# Patient Record
Sex: FEMALE | Race: WHITE | HISPANIC OR LATINO | Employment: OTHER | URBAN - METROPOLITAN AREA
[De-identification: names, ages, dates, MRNs, and addresses within clinical notes are randomized per-mention and may not be internally consistent; named-entity substitution may affect disease eponyms.]

---

## 2017-06-30 ENCOUNTER — OFFICE VISIT (OUTPATIENT)
Dept: URGENT CARE | Facility: PHYSICIAN GROUP | Age: 77
End: 2017-06-30
Payer: COMMERCIAL

## 2017-06-30 ENCOUNTER — HOSPITAL ENCOUNTER (OUTPATIENT)
Facility: MEDICAL CENTER | Age: 77
End: 2017-06-30
Attending: PHYSICIAN ASSISTANT
Payer: COMMERCIAL

## 2017-06-30 VITALS
DIASTOLIC BLOOD PRESSURE: 70 MMHG | HEART RATE: 67 BPM | HEIGHT: 63 IN | WEIGHT: 149 LBS | OXYGEN SATURATION: 95 % | SYSTOLIC BLOOD PRESSURE: 138 MMHG | BODY MASS INDEX: 26.4 KG/M2 | TEMPERATURE: 97.4 F

## 2017-06-30 DIAGNOSIS — N30.00 ACUTE CYSTITIS WITHOUT HEMATURIA: ICD-10-CM

## 2017-06-30 LAB
APPEARANCE UR: CLEAR
BILIRUB UR STRIP-MCNC: NORMAL MG/DL
COLOR UR AUTO: YELLOW
GLUCOSE UR STRIP.AUTO-MCNC: NORMAL MG/DL
KETONES UR STRIP.AUTO-MCNC: NORMAL MG/DL
LEUKOCYTE ESTERASE UR QL STRIP.AUTO: NORMAL
NITRITE UR QL STRIP.AUTO: NORMAL
PH UR STRIP.AUTO: 5 [PH] (ref 5–8)
PROT UR QL STRIP: NORMAL MG/DL
RBC UR QL AUTO: NORMAL
SP GR UR STRIP.AUTO: 1.02
UROBILINOGEN UR STRIP-MCNC: NORMAL MG/DL

## 2017-06-30 PROCEDURE — 99204 OFFICE O/P NEW MOD 45 MIN: CPT | Performed by: PHYSICIAN ASSISTANT

## 2017-06-30 PROCEDURE — 87077 CULTURE AEROBIC IDENTIFY: CPT

## 2017-06-30 PROCEDURE — 81002 URINALYSIS NONAUTO W/O SCOPE: CPT | Performed by: PHYSICIAN ASSISTANT

## 2017-06-30 PROCEDURE — 87086 URINE CULTURE/COLONY COUNT: CPT

## 2017-06-30 PROCEDURE — 87186 SC STD MICRODIL/AGAR DIL: CPT

## 2017-06-30 RX ORDER — CEFDINIR 300 MG/1
300 CAPSULE ORAL EVERY 12 HOURS
Qty: 10 CAP | Refills: 0 | Status: SHIPPED | OUTPATIENT
Start: 2017-06-30 | End: 2017-07-05

## 2017-06-30 ASSESSMENT — ENCOUNTER SYMPTOMS
SHORTNESS OF BREATH: 0
PALPITATIONS: 0
FLANK PAIN: 0
BACK PAIN: 0
CHILLS: 0
NAUSEA: 0
FEVER: 0
VOMITING: 0
COUGH: 0

## 2017-06-30 NOTE — PROGRESS NOTES
Subjective:      Aurelia Liz is a 76 y.o. female who presents with Painful Urination            Dysuria   This is a new problem. The current episode started yesterday. The problem occurs every urination. The problem has been unchanged. The quality of the pain is described as burning. The pain is moderate. There has been no fever. There is no history of pyelonephritis. Associated symptoms include frequency, hesitancy and urgency. Pertinent negatives include no chills, discharge, flank pain, hematuria, nausea, possible pregnancy or vomiting. She has tried nothing for the symptoms.       Review of Systems   Constitutional: Negative for fever and chills.   Respiratory: Negative for cough and shortness of breath.    Cardiovascular: Negative for chest pain and palpitations.   Gastrointestinal: Negative for nausea and vomiting.   Genitourinary: Positive for dysuria, hesitancy, urgency and frequency. Negative for hematuria and flank pain.   Musculoskeletal: Negative for back pain.     All other systems reviewed and are negative.  PMH:  has a past medical history of Benign tumor of breast; HTN (hypertension); Indigestion; Unspecified cataract; Pain; and Renal disorder.  MEDS:   Current outpatient prescriptions:   •  cefdinir (OMNICEF) 300 MG Cap, Take 1 Cap by mouth every 12 hours for 5 days., Disp: 10 Cap, Rfl: 0  •  vitamin D (CHOLECALCIFEROL) 1000 UNIT TABS, Take 1,000 Units by mouth every day., Disp: , Rfl:   •  Zolpidem Tartrate (AMBIEN PO), Take  by mouth. AT NIGHT PRN, Disp: , Rfl:   •  acetaminophen (TYLENOL) 325 MG TABS, Take 650 mg by mouth every 6 hours as needed., Disp: , Rfl:   •  MULTIPLE VITAMINS PO, Take  by mouth every day., Disp: , Rfl:   •  Fiber CAPS, Take 1 Tab by mouth every day., Disp: , Rfl:   •  tramadol (ULTRAM) 50 MG TABS, Take 50 mg by mouth every four hours as needed., Disp: , Rfl:   •  oxycodone-acetaminophen (PERCOCET) 5-325 MG TABS, Take 1-2 Tabs by mouth every four hours as needed.,  "Disp: 12 Tab, Rfl: 0  •  ondansetron (ZOFRAN ODT) 4 MG TBDP, Take 1 Tab by mouth every 8 hours as needed for Nausea/Vomiting., Disp: 10 Tab, Rfl: 0  •  aspirin EC (ECOTRIN) 81 MG TBEC, Take 81 mg by mouth every day., Disp: , Rfl:   •  olmesartan-hydrochlorothiazide (BENICAR HCT) 20-12.5 MG per tablet, Take 1 Tab by mouth 2 times a day., Disp: , Rfl:   ALLERGIES:   Allergies   Allergen Reactions   • Sulfa Drugs Hives   • Other Drug      'NO IBUPROFEN,R/T KIDNEY FUNCTION'      SURGHX:   Past Surgical History   Procedure Laterality Date   • Cataract phaco with iol  5/13/2013     Performed by Chris Nguyen M.D. at SURGERY [facility]   • Cataract phaco with iol  6/3/2013     Performed by Chris Nguyen M.D. at SURGERY [facility]   • Hysterectomy radical     • Knee arthroscopy  12/18/2014     Performed by Robert Hernandez M.D. at SURGERY [facility]   • Medial meniscectomy  12/18/2014     Performed by Robert Hernandez M.D. at SURGERY [facility]     SOCHX:  reports that she has quit smoking. Her smoking use included Cigarettes. She started smoking about 28 years ago. She has a 30 pack-year smoking history. She does not have any smokeless tobacco history on file. She reports that she drinks alcohol. She reports that she does not use illicit drugs.  FH: Family history was reviewed, no pertinent findings to report  Medications, Allergies, and current problem list reviewed today in Epic           Objective:     /70 mmHg  Pulse 67  Temp(Src) 36.3 °C (97.4 °F)  Ht 1.588 m (5' 2.5\")  Wt 67.586 kg (149 lb)  BMI 26.80 kg/m2  SpO2 95%     Physical Exam   Constitutional: She is oriented to person, place, and time. She appears well-developed and well-nourished.   Neck: Normal range of motion. Neck supple.   Cardiovascular: Normal rate, regular rhythm and normal heart sounds.    Pulmonary/Chest: Effort normal and breath sounds normal.   Musculoskeletal: She exhibits no tenderness.   No CVA " tenderness   Neurological: She is alert and oriented to person, place, and time.   Skin: Skin is warm and dry.   Psychiatric: She has a normal mood and affect. Her behavior is normal. Judgment and thought content normal.   Vitals reviewed.          POC Color Yellow Negative Final     POC Appearance Clear Negative Final     POC Leukocyte Esterase 2+ Negative Final     POC Nitrites Pos Negative Final     POC Urobiligen Neg Negative (0.2) mg/dL Final     POC Protein Neg Negative mg/dL Final     POC Urine PH 5.0 5.0 - 8.0 Final     POC Blood 1+ Negative Final     POC Specific Gravity 1.020 <1.005 - >1.030 Final     POC Ketones Neg Negative mg/dL Final     POC Biliruben Neg Negative mg/dL Final     POC Glucose Neg Negative mg/dL           Assessment/Plan:     1. Acute cystitis without hematuria [N30.00]    - POCT Urinalysis  - Urine Culture; Future  - cefdinir (OMNICEF) 300 MG Cap; Take 1 Cap by mouth every 12 hours for 5 days.  Dispense: 10 Cap; Refill: 0    Differential diagnosis, natural history, supportive care, and indications for immediate follow-up discussed at length.   Follow-up with primary care provider within 4-5 days, emergency room precautions discussed.  Patient and/or family appears understanding of information.

## 2017-06-30 NOTE — MR AVS SNAPSHOT
"        Aurelia Petersonlan   2017 10:05 AM   Office Visit   MRN: [id number]    Department:  [facility]   Dept Phone:  [phone]    Description:  Female : 1940   Provider:  Clem Fernandez PA-C           Reason for Visit     Painful Urination Painful urination/burning/frequent urination x2 days       Allergies as of 2017     Allergen Noted Reactions    Sulfa Drugs 2008   Hives    Other Drug 2014       'NO IBUPROFEN,R/T KIDNEY FUNCTION'       You were diagnosed with     Acute cystitis without hematuria   [[id number]]         Vital Signs     Blood Pressure Pulse Temperature Height Weight Body Mass Index    138/70 mmHg 67 36.3 °C (97.4 °F) 1.588 m (5' 2.5\") 67.586 kg (149 lb) 26.80 kg/m2    Oxygen Saturation Smoking Status                95% Former Smoker          Basic Information     Date Of Birth Sex Race Ethnicity Preferred Language    1940 Female White Non- English      Problem List              ICD-10-CM Priority Class Noted - Resolved    DJD (degenerative joint disease) of knee M17.10   2014 - Present      Health Maintenance     Patient has no pending health maintenance at this time      Results     POCT Urinalysis      Component Value Standard Range & Units    POC Color Yellow Negative    POC Appearance Clear Negative    POC Leukocyte Esterase 2+ Negative    POC Nitrites Pos Negative    POC Urobiligen Neg Negative (0.2) mg/dL    POC Protein Neg Negative mg/dL    POC Urine PH 5.0 5.0 - 8.0    POC Blood 1+ Negative    POC Specific Gravity 1.020 <1.005 - >1.030    POC Ketones Neg Negative mg/dL    POC Biliruben Neg Negative mg/dL    POC Glucose Neg Negative mg/dL                        Current Immunizations     No immunizations on file.      Below and/or attached are the medications your provider expects you to take. Review all of your home medications and newly ordered medications with your provider and/or pharmacist. Follow medication instructions as " directed by your provider and/or pharmacist. Please keep your medication list with you and share with your provider. Update the information when medications are discontinued, doses are changed, or new medications (including over-the-counter products) are added; and carry medication information at all times in the event of emergency situations     Allergies:  SULFA DRUGS - Hives     OTHER DRUG - (reactions not documented)               Medications  Valid as of: June 30, 2017 - 10:40 AM    Generic Name Brand Name Tablet Size Instructions for use    Acetaminophen (Tab) TYLENOL 325 MG Take 650 mg by mouth every 6 hours as needed.        Aspirin (Tablet Delayed Response) ECOTRIN 81 MG Take 81 mg by mouth every day.        Cefdinir (Cap) OMNICEF 300 MG Take 1 Cap by mouth every 12 hours for 5 days.        Cholecalciferol (Tab) cholecalciferol 1000 UNIT Take 1,000 Units by mouth every day.        Fiber (Cap) Fiber  Take 1 Tab by mouth every day.        Multiple Vitamin   Take  by mouth every day.        Olmesartan Medoxomil-HCTZ (Tab) BENICAR HCT 20-12.5 MG Take 1 Tab by mouth 2 times a day.        Ondansetron (TABLET DISPERSIBLE) ZOFRAN ODT 4 MG Take 1 Tab by mouth every 8 hours as needed for Nausea/Vomiting.        Oxycodone-Acetaminophen (Tab) PERCOCET 5-325 MG Take 1-2 Tabs by mouth every four hours as needed.        TraMADol HCl (Tab) ULTRAM 50 MG Take 50 mg by mouth every four hours as needed.        Zolpidem Tartrate   Take  by mouth. AT NIGHT PRN        .                 Medicines prescribed today were sent to:     [id number], [place] - [address] [address]    Phone: [phone] Fax: [phone]    Open 24 Hours?: No      Medication refill instructions:       If your prescription bottle indicates you have medication refills left, it is not necessary to call your provider’s office. Please contact your pharmacy and they will refill your medication.    If your  prescription bottle indicates you do not have any refills left, you may request refills at any time through one of the following ways: The online [employer] system (except Urgent Care), by calling your provider’s office, or by asking your pharmacy to contact your provider’s office with a refill request. Medication refills are processed only during regular business hours and may not be available until the next business day. Your provider may request additional information or to have a follow-up visit with you prior to refilling your medication.   *Please Note: Medication refills are assigned a new Rx number when refilled electronically. Your pharmacy may indicate that no refills were authorized even though a new prescription for the same medication is available at the pharmacy. Please request the medicine by name with the pharmacy before contacting your provider for a refill.        Your To Do List     Future Labs/Procedures Complete By Expires    Urine Culture  As directed 6/30/2018      Instructions      Urinary Tract Infection  A urinary tract infection (UTI) can occur any place along the urinary tract. The tract includes the kidneys, ureters, bladder, and urethra. A type of germ called bacteria often causes a UTI. UTIs are often helped with antibiotic medicine.   HOME CARE   · If given, take antibiotics as told by your doctor. Finish them even if you start to feel better.  · Drink enough fluids to keep your pee (urine) clear or pale yellow.  · Avoid tea, drinks with caffeine, and bubbly (carbonated) drinks.  · Pee often. Avoid holding your pee in for a long time.  · Pee before and after having sex (intercourse).  · Wipe from front to back after you poop (bowel movement) if you are a woman. Use each tissue only once.  GET HELP RIGHT AWAY IF:   · You have back pain.  · You have lower belly (abdominal) pain.  · You have chills.  · You feel sick to your stomach (nauseous).  · You throw up (vomit).  · Your burning or  discomfort with peeing does not go away.  · You have a fever.  · Your symptoms are not better in 3 days.  MAKE SURE YOU:   · Understand these instructions.  · Will watch your condition.  · Will get help right away if you are not doing well or get worse.     This information is not intended to replace advice given to you by your health care provider. Make sure you discuss any questions you have with your health care provider.     Document Released: 06/05/2009 Document Revised: 01/08/2016 Document Reviewed: 07/18/2013  [employer] Interactive Patient Education ©2016 Elsevier Inc.  e          [employer] Access Code: Activation code not generated  Current Soma Status: Active

## 2017-07-02 LAB
BACTERIA UR CULT: ABNORMAL
SIGNIFICANT IND 70042: ABNORMAL
SOURCE SOURCE: ABNORMAL

## 2017-07-10 ENCOUNTER — HOSPITAL ENCOUNTER (OUTPATIENT)
Dept: LAB | Facility: MEDICAL CENTER | Age: 77
End: 2017-07-10
Attending: INTERNAL MEDICINE
Payer: COMMERCIAL

## 2017-07-10 LAB
ALBUMIN SERPL BCP-MCNC: 3.5 G/DL (ref 3.2–4.9)
ALBUMIN/GLOB SERPL: 1.1 G/DL
ALP SERPL-CCNC: 77 U/L (ref 30–99)
ALT SERPL-CCNC: 11 U/L (ref 2–50)
ANION GAP SERPL CALC-SCNC: 8 MMOL/L (ref 0–11.9)
APPEARANCE UR: CLEAR
AST SERPL-CCNC: 16 U/L (ref 12–45)
BASOPHILS # BLD AUTO: 1 % (ref 0–1.8)
BASOPHILS # BLD: 0.07 K/UL (ref 0–0.12)
BILIRUB SERPL-MCNC: 0.6 MG/DL (ref 0.1–1.5)
BILIRUB UR QL STRIP.AUTO: NEGATIVE
BUN SERPL-MCNC: 22 MG/DL (ref 8–22)
CALCIUM SERPL-MCNC: 8.4 MG/DL (ref 8.5–10.5)
CHLORIDE SERPL-SCNC: 108 MMOL/L (ref 96–112)
CO2 SERPL-SCNC: 24 MMOL/L (ref 20–33)
COLOR UR: YELLOW
CREAT SERPL-MCNC: 0.87 MG/DL (ref 0.5–1.4)
EOSINOPHIL # BLD AUTO: 0.17 K/UL (ref 0–0.51)
EOSINOPHIL NFR BLD: 2.4 % (ref 0–6.9)
ERYTHROCYTE [DISTWIDTH] IN BLOOD BY AUTOMATED COUNT: 47.2 FL (ref 35.9–50)
GFR SERPL CREATININE-BSD FRML MDRD: >60 ML/MIN/1.73 M 2
GLOBULIN SER CALC-MCNC: 3.2 G/DL (ref 1.9–3.5)
GLUCOSE SERPL-MCNC: 79 MG/DL (ref 65–99)
GLUCOSE UR STRIP.AUTO-MCNC: NEGATIVE MG/DL
HCT VFR BLD AUTO: 39.5 % (ref 37–47)
HGB BLD-MCNC: 12.8 G/DL (ref 12–16)
IMM GRANULOCYTES # BLD AUTO: 0.02 K/UL (ref 0–0.11)
IMM GRANULOCYTES NFR BLD AUTO: 0.3 % (ref 0–0.9)
KETONES UR STRIP.AUTO-MCNC: NEGATIVE MG/DL
LEUKOCYTE ESTERASE UR QL STRIP.AUTO: NEGATIVE
LYMPHOCYTES # BLD AUTO: 1.6 K/UL (ref 1–4.8)
LYMPHOCYTES NFR BLD: 22.7 % (ref 22–41)
MCH RBC QN AUTO: 30.7 PG (ref 27–33)
MCHC RBC AUTO-ENTMCNC: 32.4 G/DL (ref 33.6–35)
MCV RBC AUTO: 94.7 FL (ref 81.4–97.8)
MICRO URNS: NORMAL
MONOCYTES # BLD AUTO: 0.47 K/UL (ref 0–0.85)
MONOCYTES NFR BLD AUTO: 6.7 % (ref 0–13.4)
NEUTROPHILS # BLD AUTO: 4.72 K/UL (ref 2–7.15)
NEUTROPHILS NFR BLD: 66.9 % (ref 44–72)
NITRITE UR QL STRIP.AUTO: NEGATIVE
NRBC # BLD AUTO: 0 K/UL
NRBC BLD AUTO-RTO: 0 /100 WBC
PH UR STRIP.AUTO: 5.5 [PH]
PHOSPHATE SERPL-MCNC: 3.3 MG/DL (ref 2.5–4.5)
PLATELET # BLD AUTO: 249 K/UL (ref 164–446)
PMV BLD AUTO: 11 FL (ref 9–12.9)
POTASSIUM SERPL-SCNC: 4.1 MMOL/L (ref 3.6–5.5)
PROT SERPL-MCNC: 6.7 G/DL (ref 6–8.2)
PROT UR QL STRIP: NEGATIVE MG/DL
RBC # BLD AUTO: 4.17 M/UL (ref 4.2–5.4)
RBC UR QL AUTO: NEGATIVE
SODIUM SERPL-SCNC: 140 MMOL/L (ref 135–145)
SP GR UR STRIP.AUTO: 1.02
UROBILINOGEN UR STRIP.AUTO-MCNC: 0.2 MG/DL
WBC # BLD AUTO: 7.1 K/UL (ref 4.8–10.8)

## 2017-07-10 PROCEDURE — 81003 URINALYSIS AUTO W/O SCOPE: CPT

## 2017-07-10 PROCEDURE — 36415 COLL VENOUS BLD VENIPUNCTURE: CPT

## 2017-07-10 PROCEDURE — 80053 COMPREHEN METABOLIC PANEL: CPT

## 2017-07-10 PROCEDURE — 85025 COMPLETE CBC W/AUTO DIFF WBC: CPT

## 2017-07-10 PROCEDURE — 84100 ASSAY OF PHOSPHORUS: CPT

## 2017-11-08 ENCOUNTER — HOSPITAL ENCOUNTER (OUTPATIENT)
Dept: LAB | Facility: MEDICAL CENTER | Age: 77
End: 2017-11-08
Attending: PHYSICIAN ASSISTANT
Payer: COMMERCIAL

## 2017-11-08 LAB
25(OH)D3 SERPL-MCNC: 36 NG/ML (ref 30–100)
ALBUMIN SERPL BCP-MCNC: 3.8 G/DL (ref 3.2–4.9)
ALBUMIN/GLOB SERPL: 1.1 G/DL
ALP SERPL-CCNC: 77 U/L (ref 30–99)
ALT SERPL-CCNC: 13 U/L (ref 2–50)
ANION GAP SERPL CALC-SCNC: 11 MMOL/L (ref 0–11.9)
AST SERPL-CCNC: 13 U/L (ref 12–45)
BILIRUB SERPL-MCNC: 0.9 MG/DL (ref 0.1–1.5)
BNP SERPL-MCNC: 9 PG/ML (ref 0–100)
BUN SERPL-MCNC: 31 MG/DL (ref 8–22)
CALCIUM SERPL-MCNC: 9.2 MG/DL (ref 8.5–10.5)
CHLORIDE SERPL-SCNC: 102 MMOL/L (ref 96–112)
CHOLEST SERPL-MCNC: 185 MG/DL (ref 100–199)
CO2 SERPL-SCNC: 25 MMOL/L (ref 20–33)
CREAT SERPL-MCNC: 1.09 MG/DL (ref 0.5–1.4)
ERYTHROCYTE [DISTWIDTH] IN BLOOD BY AUTOMATED COUNT: 47.1 FL (ref 35.9–50)
GFR SERPL CREATININE-BSD FRML MDRD: 49 ML/MIN/1.73 M 2
GLOBULIN SER CALC-MCNC: 3.6 G/DL (ref 1.9–3.5)
GLUCOSE SERPL-MCNC: 102 MG/DL (ref 65–99)
HCT VFR BLD AUTO: 42.9 % (ref 37–47)
HDLC SERPL-MCNC: 47 MG/DL
HGB BLD-MCNC: 14.1 G/DL (ref 12–16)
LDLC SERPL CALC-MCNC: 115 MG/DL
MCH RBC QN AUTO: 30.9 PG (ref 27–33)
MCHC RBC AUTO-ENTMCNC: 32.9 G/DL (ref 33.6–35)
MCV RBC AUTO: 93.9 FL (ref 81.4–97.8)
PLATELET # BLD AUTO: 245 K/UL (ref 164–446)
PMV BLD AUTO: 10.9 FL (ref 9–12.9)
POTASSIUM SERPL-SCNC: 4.6 MMOL/L (ref 3.6–5.5)
PROT SERPL-MCNC: 7.4 G/DL (ref 6–8.2)
RBC # BLD AUTO: 4.57 M/UL (ref 4.2–5.4)
SODIUM SERPL-SCNC: 138 MMOL/L (ref 135–145)
TRIGL SERPL-MCNC: 114 MG/DL (ref 0–149)
TSH SERPL DL<=0.005 MIU/L-ACNC: 3.2 UIU/ML (ref 0.3–3.7)
WBC # BLD AUTO: 5.8 K/UL (ref 4.8–10.8)

## 2017-11-08 PROCEDURE — 83880 ASSAY OF NATRIURETIC PEPTIDE: CPT

## 2017-11-08 PROCEDURE — 84443 ASSAY THYROID STIM HORMONE: CPT

## 2017-11-08 PROCEDURE — 36415 COLL VENOUS BLD VENIPUNCTURE: CPT

## 2017-11-08 PROCEDURE — 82306 VITAMIN D 25 HYDROXY: CPT

## 2017-11-08 PROCEDURE — 85027 COMPLETE CBC AUTOMATED: CPT

## 2017-11-08 PROCEDURE — 80053 COMPREHEN METABOLIC PANEL: CPT

## 2017-11-08 PROCEDURE — 80061 LIPID PANEL: CPT

## 2017-11-09 ENCOUNTER — TELEPHONE (OUTPATIENT)
Dept: CARDIOLOGY | Facility: MEDICAL CENTER | Age: 77
End: 2017-11-09

## 2017-11-09 NOTE — TELEPHONE ENCOUNTER
Spoke with pt and she has not seen a prior cards but was recently seen at [facility] ER. Faxing request for records

## 2017-11-17 ENCOUNTER — OFFICE VISIT (OUTPATIENT)
Dept: CARDIOLOGY | Facility: MEDICAL CENTER | Age: 77
End: 2017-11-17
Payer: COMMERCIAL

## 2017-11-17 VITALS
WEIGHT: 145 LBS | BODY MASS INDEX: 25.69 KG/M2 | HEIGHT: 63 IN | SYSTOLIC BLOOD PRESSURE: 140 MMHG | OXYGEN SATURATION: 93 % | HEART RATE: 86 BPM | DIASTOLIC BLOOD PRESSURE: 70 MMHG

## 2017-11-17 DIAGNOSIS — E78.2 MIXED HYPERLIPIDEMIA: ICD-10-CM

## 2017-11-17 DIAGNOSIS — I10 ESSENTIAL HYPERTENSION, BENIGN: ICD-10-CM

## 2017-11-17 PROCEDURE — 99204 OFFICE O/P NEW MOD 45 MIN: CPT | Performed by: INTERNAL MEDICINE

## 2017-11-17 RX ORDER — AMLODIPINE BESYLATE 5 MG/1
5 TABLET ORAL DAILY
COMMUNITY
End: 2018-01-11 | Stop reason: SDUPTHER

## 2017-11-17 NOTE — PROGRESS NOTES
Subjective:   Aurelia Liz is a 77 y.o. female who presents today for evaluation of hypertension. On  the patient felt her heart pounding  very strong, checked her blood pressure which was quite elevated, then checked it again and it was higher. She decided to go to [facility] for evaluation. Her blood pressure on arrival there was 230 systolic. She didn't receive any treatment for that but underwent a chest x-ray and a CT of the head which were unremarkable. She had troponins which were normal and EKG which apparently was normal. After all these tests were done, her blood pressure came down to her usual and she was discharged home. She followed up with Dr. Roblero who began amlodipine 5 mg a day as a new medication. Previously the patient had been on Benicar/HCT 40/12.5 for several years. The patient has felt slightly different since starting the amlodipine but it is not a terribly uncomfortable feeling. He has occasional palpitations but nothing sustained. She is very careful with salt and has not added salt to her food for quite some time. Her  recently had coronary bypass surgery and for the past 3 weeks they've really changed their eating habits. She is a social drinker and a  nonsmoker since . There is a family history of coronary artery disease but it occurred in her father at age 76 and he lived until age 87. Her mother  at age 83 after gradually going downhill and finally dying in a nursing home. The patient has a history of high cholesterol but no diabetes. She has arthritic issues in her knees but is unlimited in her ability to walk from a heart perspective. She has been under some pressure recently because her 's bypass surgery after a surprise diagnosis of coronary artery disease during a preoperative evaluation. She has no symptoms of congestive heart failure such as orthopnea, PND, pedal edema. She has no stroke or TIA symptoms. There are no  symptoms of claudication.  Past Medical History:   Diagnosis Date   • Benign tumor of breast    • HTN (hypertension)    • Indigestion    • Pain     LEFT KNEE   • Renal disorder     'watching kidney function,sees a nephrologist anually'   • Unspecified cataract          Hyperlipidemia  Past Surgical History:   Procedure Laterality Date   • KNEE ARTHROSCOPY  12/18/2014    Performed by Robert Hernandez M.D. at SURGERY [facility]   • MEDIAL MENISCECTOMY  12/18/2014    Performed by Robret Hernandez M.D. at SURGERY [facility]   • CATARACT PHACO WITH IOL  6/3/2013    Performed by Chris Nguyen M.D. at SURGERY [facility]   • CATARACT PHACO WITH IOL  5/13/2013    Performed by Chris Nguyen M.D. at SURGERY [facility]   • HYSTERECTOMY RADICAL       Family History   Problem Relation Age of Onset   • Cancer Mother    • Cancer Sister      History   Smoking Status   • Former Smoker   • Packs/day: 1.50   • Years: 20.00   • Types: Cigarettes   • Start date: 1/1/1989   Smokeless Tobacco   • Never Used     Allergies   Allergen Reactions   • Sulfa Drugs Hives   • Other Drug      'NO IBUPROFEN,R/T KIDNEY FUNCTION'      Outpatient Encounter Prescriptions as of 11/17/2017   Medication Sig Dispense Refill   • amlodipine (NORVASC) 5 MG Tab Take 5 mg by mouth every day.     • vitamin D (CHOLECALCIFEROL) 1000 UNIT TABS Take 1,000 Units by mouth every day.     • Zolpidem Tartrate (AMBIEN PO) Take  by mouth. AT NIGHT PRN     • aspirin EC (ECOTRIN) 81 MG TBEC Take 81 mg by mouth every day.     • acetaminophen (TYLENOL) 325 MG TABS Take 650 mg by mouth every 6 hours as needed.     • olmesartan-hydrochlorothiazide (BENICAR HCT) 20-12.5 MG per tablet Take 1 Tab by mouth 2 times a day.     • tramadol (ULTRAM) 50 MG TABS Take 50 mg by mouth every four hours as needed.     • oxycodone-acetaminophen (PERCOCET) 5-325 MG TABS Take 1-2 Tabs by mouth every four hours as needed. (Patient not taking: Reported on 11/17/2017)  "12 Tab 0   • ondansetron (ZOFRAN ODT) 4 MG TBDP Take 1 Tab by mouth every 8 hours as needed for Nausea/Vomiting. (Patient not taking: Reported on 11/17/2017) 10 Tab 0   • MULTIPLE VITAMINS PO Take  by mouth every day.     • Fiber CAPS Take 1 Tab by mouth every day.       No facility-administered encounter medications on file as of 11/17/2017.      ROS Review of Systems   Constitutional: Negative for fever, chills, weight loss and fatigue.   HENT: Negative for congestion, sore throat. Headache with HTN  Eyes: No change in vision  Respiratory: Negative for cough, shortness of breath and wheezing.    Cardiovascular: See HPI. No chest pain, pressure, tightness,  No orthopnea, PND, edema. No syncope or lightheadedness  Gastrointestinal: Negative for  vomiting, diarrhea. Slight sick feeling from amlodipine  Musculoskeletal: Negative for muscle aches significant arthritic pain in her knee  Skin: Negative for rash and itching.   Neurological: No loss of sensation or motor function. No tremor  Psychiatric/Behavioral: No depression, suicidal ideation. Somewhat stressful social situation recently  Hematologic: No bleeding tendency or easy bruising     Objective:   /70   Pulse 86   Ht 1.588 m (5' 2.5\")   Wt 65.8 kg (145 lb)   SpO2 93%   BMI 26.10 kg/m²     Physical Exam   Exam:    Vitals:    11/17/17 1452   BP: 140/70   Pulse: 86   SpO2: 93%   Weight: 65.8 kg (145 lb)   Height: 1.588 m (5' 2.5\")     Constitutional: Well developed, well nourished female in no acute distress. Appears approximate stated age and provides a good history.Repeat blood pressure 134/60 8F time sitting 136/66 right arm sitting  HEENT: Normocephalic, pupils are equal and responsive. bilateral arcus. Conjunctiva and sclera are clear. No xanthelasma. diagonal ear lobe crease noted. Mucus membranes are moist and pink. Good oral hygiene  Neck: No JVD or HJR. JVP = 4-5cm H2O. Good carotid upstroke with no bruit. No lymphadenopathy, No thyroid " enlargement.  Cardiovascular: Regular rhythm, no ectopics. No murmur, gallop, rub or click. No lift, heave,  thrill, or cardiomegaly  Lungs: Normal effort, Clear to auscultation and percussion. No rales, rhonchi, wheezing   Abdomen: Soft, non tender. No liver, kidney, spleen or mass palpable. The abdominal aorta is not palpable. Active bowel sounds are noted and there is no bruit  Extremities:  No cyanosis, edema, clubbing. Palpable posterior tibial and dorsal pedal pulses bilaterally. Femoral pulses are good and symmetrical  Skin:   Warm and dry, no active lesions  Neurologic: Alert & oriented. Strength and sensation grossly intact. No lateralizing signs  Psychiatric:  Affect, mood, and judgement are appropriate    Assessment:     1. Essential hypertension, benign     2. Hyperlipidemia   VAP CHOLESTEROL       Medical Decision Making:  Today's Assessment / Status / Plan:   The patient's blood pressure is fairly well controlled on the above regimen. I think the Benicar/HCT is a good baseline form of therapy and amlodipine is a good addition to the above. She feels slightly uneasy taking amlodipine. I suggested that she just check her blood pressure in the morning 2 or 3 times a week. If she finds that it is between one 140-150  first thing in the morning, she could increase the amlodipine to 10 mg a day. The episodes when the  blood pressure went as high as 230 systolic was probably aggravated by anxiety given her stressful social situation at that time. She will check her blood pressure regularly and at times when she feels extremely anxious. She can always take an extra 5 mg of amlodipine if her blood pressure is elevated. She watches salt closely. It is possible that increasing the Benicar and the HCT may be of some benefit in the future if she is unable to tolerate amlodipine.    The patient's cholesterol is reasonable at 185 but her HDL is on the low side for a lady at 47 and her LDL slightly elevated at 115. I  suggested that she have a VAP cholesterol and she returns here in 3 months to determine whether she has predominantly large or small  particles. She has diagonal ear lobe crease and bilateral arcus which might suggest that she does have more  small particles and is a candidate for statin therapy. Between now and her return in 3 months she is going to try to lose a few pounds and emphasize plant based foods in her diet to see if this makes a difference in her cholesterol level. I told her she could return at any time if she becomes concerned about what's happening with her blood pressure or with any of her medications. Will follow-up with Dr. Roblero as appointed.

## 2017-11-20 ENCOUNTER — TELEPHONE (OUTPATIENT)
Dept: CARDIOLOGY | Facility: MEDICAL CENTER | Age: 77
End: 2017-11-20

## 2017-11-20 NOTE — TELEPHONE ENCOUNTER
L/M patient I will be mailing lab slip she is to do labs prior to appt with  if she has any questions she can call our office [phone]

## 2017-12-06 ENCOUNTER — TELEPHONE (OUTPATIENT)
Dept: RADIOLOGY | Facility: MEDICAL CENTER | Age: 77
End: 2017-12-06

## 2017-12-06 NOTE — TELEPHONE ENCOUNTER
Spoke w/ pt to conf apt @ [facility] on 12/7 @ 9:15 check in @ 9:00, reviewed prep and location

## 2017-12-07 ENCOUNTER — HOSPITAL ENCOUNTER (OUTPATIENT)
Dept: RADIOLOGY | Facility: MEDICAL CENTER | Age: 77
End: 2017-12-07
Attending: PHYSICIAN ASSISTANT
Payer: COMMERCIAL

## 2017-12-07 DIAGNOSIS — N64.4 BREAST PAIN, LEFT: ICD-10-CM

## 2017-12-07 PROCEDURE — 76642 ULTRASOUND BREAST LIMITED: CPT | Mod: LT

## 2017-12-07 PROCEDURE — G0279 TOMOSYNTHESIS, MAMMO: HCPCS

## 2017-12-12 ENCOUNTER — HOSPITAL ENCOUNTER (OUTPATIENT)
Dept: LAB | Facility: MEDICAL CENTER | Age: 77
End: 2017-12-12
Attending: NURSE PRACTITIONER
Payer: COMMERCIAL

## 2017-12-12 LAB
25(OH)D3 SERPL-MCNC: 38 NG/ML (ref 30–100)
ALBUMIN SERPL BCP-MCNC: 3.7 G/DL (ref 3.2–4.9)
APPEARANCE UR: CLEAR
BASOPHILS # BLD AUTO: 1.1 % (ref 0–1.8)
BASOPHILS # BLD: 0.06 K/UL (ref 0–0.12)
BILIRUB UR QL STRIP.AUTO: NEGATIVE
BUN SERPL-MCNC: 28 MG/DL (ref 8–22)
CALCIUM SERPL-MCNC: 9.4 MG/DL (ref 8.5–10.5)
CHLORIDE SERPL-SCNC: 104 MMOL/L (ref 96–112)
CO2 SERPL-SCNC: 27 MMOL/L (ref 20–33)
COLOR UR: YELLOW
CREAT SERPL-MCNC: 1.06 MG/DL (ref 0.5–1.4)
CREAT UR-MCNC: 173.1 MG/DL
EOSINOPHIL # BLD AUTO: 0.13 K/UL (ref 0–0.51)
EOSINOPHIL NFR BLD: 2.4 % (ref 0–6.9)
ERYTHROCYTE [DISTWIDTH] IN BLOOD BY AUTOMATED COUNT: 44.9 FL (ref 35.9–50)
GFR SERPL CREATININE-BSD FRML MDRD: 50 ML/MIN/1.73 M 2
GLUCOSE SERPL-MCNC: 100 MG/DL (ref 65–99)
GLUCOSE UR STRIP.AUTO-MCNC: NEGATIVE MG/DL
HCT VFR BLD AUTO: 40.3 % (ref 37–47)
HGB BLD-MCNC: 13.1 G/DL (ref 12–16)
IMM GRANULOCYTES # BLD AUTO: 0.02 K/UL (ref 0–0.11)
IMM GRANULOCYTES NFR BLD AUTO: 0.4 % (ref 0–0.9)
KETONES UR STRIP.AUTO-MCNC: NEGATIVE MG/DL
LEUKOCYTE ESTERASE UR QL STRIP.AUTO: NEGATIVE
LYMPHOCYTES # BLD AUTO: 1.46 K/UL (ref 1–4.8)
LYMPHOCYTES NFR BLD: 26.9 % (ref 22–41)
MAGNESIUM SERPL-MCNC: 1.9 MG/DL (ref 1.5–2.5)
MCH RBC QN AUTO: 30.9 PG (ref 27–33)
MCHC RBC AUTO-ENTMCNC: 32.5 G/DL (ref 33.6–35)
MCV RBC AUTO: 95 FL (ref 81.4–97.8)
MICRO URNS: NORMAL
MONOCYTES # BLD AUTO: 0.46 K/UL (ref 0–0.85)
MONOCYTES NFR BLD AUTO: 8.5 % (ref 0–13.4)
NEUTROPHILS # BLD AUTO: 3.3 K/UL (ref 2–7.15)
NEUTROPHILS NFR BLD: 60.7 % (ref 44–72)
NITRITE UR QL STRIP.AUTO: NEGATIVE
NRBC # BLD AUTO: 0 K/UL
NRBC BLD AUTO-RTO: 0 /100 WBC
PH UR STRIP.AUTO: 5 [PH]
PHOSPHATE SERPL-MCNC: 3 MG/DL (ref 2.5–4.5)
PLATELET # BLD AUTO: 240 K/UL (ref 164–446)
PMV BLD AUTO: 10.9 FL (ref 9–12.9)
POTASSIUM SERPL-SCNC: 3.9 MMOL/L (ref 3.6–5.5)
PROT UR QL STRIP: NEGATIVE MG/DL
PROT UR-MCNC: 16.7 MG/DL (ref 0–15)
PROT/CREAT UR: 96 MG/G (ref 10–107)
RBC # BLD AUTO: 4.24 M/UL (ref 4.2–5.4)
RBC UR QL AUTO: NEGATIVE
SODIUM SERPL-SCNC: 138 MMOL/L (ref 135–145)
SP GR UR STRIP.AUTO: 1.02
URATE SERPL-MCNC: 7.1 MG/DL (ref 1.9–8.2)
UROBILINOGEN UR STRIP.AUTO-MCNC: 0.2 MG/DL
WBC # BLD AUTO: 5.4 K/UL (ref 4.8–10.8)

## 2017-12-12 PROCEDURE — 81003 URINALYSIS AUTO W/O SCOPE: CPT

## 2017-12-12 PROCEDURE — 82306 VITAMIN D 25 HYDROXY: CPT

## 2017-12-12 PROCEDURE — 84550 ASSAY OF BLOOD/URIC ACID: CPT

## 2017-12-12 PROCEDURE — 82570 ASSAY OF URINE CREATININE: CPT

## 2017-12-12 PROCEDURE — 83735 ASSAY OF MAGNESIUM: CPT

## 2017-12-12 PROCEDURE — 36415 COLL VENOUS BLD VENIPUNCTURE: CPT

## 2017-12-12 PROCEDURE — 80069 RENAL FUNCTION PANEL: CPT

## 2017-12-12 PROCEDURE — 85025 COMPLETE CBC W/AUTO DIFF WBC: CPT

## 2017-12-12 PROCEDURE — 84156 ASSAY OF PROTEIN URINE: CPT

## 2018-01-11 ENCOUNTER — OFFICE VISIT (OUTPATIENT)
Dept: MEDICAL GROUP | Facility: PHYSICIAN GROUP | Age: 78
End: 2018-01-11
Payer: COMMERCIAL

## 2018-01-11 VITALS
HEART RATE: 69 BPM | TEMPERATURE: 97 F | DIASTOLIC BLOOD PRESSURE: 82 MMHG | HEIGHT: 62 IN | BODY MASS INDEX: 27.97 KG/M2 | WEIGHT: 152 LBS | SYSTOLIC BLOOD PRESSURE: 124 MMHG | OXYGEN SATURATION: 94 %

## 2018-01-11 DIAGNOSIS — N28.9 RENAL INSUFFICIENCY: ICD-10-CM

## 2018-01-11 DIAGNOSIS — I10 ESSENTIAL HYPERTENSION: ICD-10-CM

## 2018-01-11 DIAGNOSIS — J06.9 VIRAL URI WITH COUGH: ICD-10-CM

## 2018-01-11 DIAGNOSIS — Z13.220 SCREENING FOR LIPID DISORDERS: ICD-10-CM

## 2018-01-11 DIAGNOSIS — G47.09 OTHER INSOMNIA: ICD-10-CM

## 2018-01-11 DIAGNOSIS — Z76.89 ENCOUNTER TO ESTABLISH CARE: ICD-10-CM

## 2018-01-11 PROCEDURE — 99214 OFFICE O/P EST MOD 30 MIN: CPT | Performed by: NURSE PRACTITIONER

## 2018-01-11 RX ORDER — AMLODIPINE BESYLATE 5 MG/1
5 TABLET ORAL DAILY
Qty: 90 TAB | Refills: 3 | Status: SHIPPED | OUTPATIENT
Start: 2018-01-11 | End: 2019-01-14 | Stop reason: SDUPTHER

## 2018-01-11 RX ORDER — ZOLPIDEM TARTRATE 5 MG/1
5 TABLET ORAL NIGHTLY PRN
COMMUNITY
End: 2024-03-18

## 2018-01-11 NOTE — ASSESSMENT & PLAN NOTE
Ongoing problem that remains stable, followed by DR. Cuadra every 6 months   Ref. Range 7/10/2017 11:35 11/8/2017 08:10 12/12/2017 09:29   Bun Latest Ref Range: 8 - 22 mg/dL 22 31 (H) 28 (H)   Creatinine Latest Ref Range: 0.50 - 1.40 mg/dL 0.87 1.09 1.06   GFR If  Latest Ref Range: >60 mL/min/1.73 m 2 >60 59 (A) >60   GFR If Non  Latest Ref Range: >60 mL/min/1.73 m 2 >60 49 (A) 50 (A)

## 2018-01-11 NOTE — ASSESSMENT & PLAN NOTE
Long-standing problem well controlled on current medications. Does occasionally monitor blood pressure at home with arm cuff.   About two months ago had significant increase in bp and was placed on amlodipine. Since then, has gone back to brand Benicar, which previously worked well for her.   Denies any severe headache, dizziness, vision changes, chest pain, CINTRON, palpitations, or lower extremity edema

## 2018-01-11 NOTE — ASSESSMENT & PLAN NOTE
Long-standing problem with only episodic use of Ambien. Reports using 60 tabs of Ambien in a year on average

## 2018-01-11 NOTE — PROGRESS NOTES
Chief Complaint   Patient presents with   • Establish Care   • Cough       HPI:    Aurelia Liz is a 77 y.o. female here to establish care, previously a patient of Dr. Nancy Pang. Changing providers due to insurance. Was seen in our urgent care June 2017    She reports having flu like symptoms beginning right after Angi. She has intermittent cough very randomly t/o the day. Feels some ticking in throat, uncontrolled coughing  Sleeping fine without waking up from cough. Treatments tried: nothing. In the past week denies any F/C, sob, dyspnea, wheezing, purulent sputum    HTN (hypertension)  Long-standing problem well controlled on current medications. Does occasionally monitor blood pressure at home with arm cuff.   About two months ago had significant increase in bp and was placed on amlodipine. Since then, has gone back to brand Benicar, which previously worked well for her.   Denies any severe headache, dizziness, vision changes, chest pain, CINTRON, palpitations, or lower extremity edema    Renal insufficiency  Ongoing problem that remains stable, followed by DR. Cuadra every 6 months   Ref. Range 7/10/2017 11:35 11/8/2017 08:10 12/12/2017 09:29   Bun Latest Ref Range: 8 - 22 mg/dL 22 31 (H) 28 (H)   Creatinine Latest Ref Range: 0.50 - 1.40 mg/dL 0.87 1.09 1.06   GFR If  Latest Ref Range: >60 mL/min/1.73 m 2 >60 59 (A) >60   GFR If Non  Latest Ref Range: >60 mL/min/1.73 m 2 >60 49 (A) 50 (A)       Other insomnia  Long-standing problem with only episodic use of Ambien. Reports using 60 tabs of Ambien in a year on average    Current medicines (including changes today)  Current Outpatient Prescriptions   Medication Sig Dispense Refill   • zolpidem (AMBIEN) 5 MG Tab Take 5 mg by mouth at bedtime as needed for Sleep.     • amlodipine (NORVASC) 5 MG Tab Take 1 Tab by mouth every day. 90 Tab 3   • vitamin D (CHOLECALCIFEROL) 1000 UNIT TABS Take 1,000 Units by mouth every day.      • aspirin EC (ECOTRIN) 81 MG TBEC Take 81 mg by mouth every day.     • olmesartan-hydrochlorothiazide (BENICAR HCT) 20-12.5 MG per tablet Take 1 Tab by mouth 2 times a day.       No current facility-administered medications for this visit.      She  has a past medical history of Benign tumor of breast; HTN (hypertension); Insomnia; Pain; Renal disorder; and Unspecified cataract.  She  has a past surgical history that includes cataract phaco with iol (2013); cataract phaco with iol (6/3/2013); knee arthroscopy (2014); medial meniscectomy (2014); and hysterectomy radical.  Social History   Substance Use Topics   • Smoking status: Former Smoker     Packs/day: 1.50     Years: 20.00     Types: Cigarettes     Start date: 1989   • Smokeless tobacco: Never Used   • Alcohol use 1.8 oz/week     3 Glasses of wine per week     Social History     Social History Narrative   • No narrative on file     Family History   Problem Relation Age of Onset   • Breast Cancer Mother 70   • Stroke Mother    • Breast Cancer Sister 50   • Heart Failure Father    • Hypertension Father    • Diabetes Maternal Aunt    • Diabetes Maternal Uncle      Family Status   Relation Status   • Mother  at age 83    old age   • Sister Alive   • Father  at age 87    old age   • Brother Alive   • Maternal Aunt    • Maternal Uncle      Health Maintenance Topics with due status: Overdue       Topic Date Due    IMM DTaP/Tdap/Td Vaccine 10/16/1959    COLONOSCOPY 10/16/1990    IMM ZOSTER VACCINE 10/16/2000    IMM PNEUMOCOCCAL 65+ (ADULT) LOW/MEDIUM RISK SERIES 10/16/2005    BONE DENSITY 10/26/2015    IMM INFLUENZA 2017        ROS  See form completed by patient, reviewed by me and no changes necessary. Scanned into chart.   Pertinent positives:  HEENT: +nosebleeds  CV: see HPI  : +kidney disease  Allergies: +sulfa allergy   All other systems reviewed by me and are negative.      Objective:     Blood  "pressure 124/82, pulse 69, temperature 36.1 °C (97 °F), height 1.575 m (5' 2\"), weight 68.9 kg (152 lb), SpO2 94 %. Body mass index is 27.8 kg/m².  Physical Exam:  Alert, oriented in no acute distress.  Eye contact is good, speech goal directed, affect bright.  HEENT: EOMI, conjunctiva non-injected, sclera non-icteric. No lid edema or eye drainage  Nares patent with no significant congestion or drainage.   Gross hearing intact   Oral mucous membranes pink and moist with no lesions. Oropharynx without erythema or exudate  Neck: supple with no cervical or supraclavicular lymphadenopathy, palpable thyroid nodules or thyromegaly.  Lungs: unlabored, clear to auscultation bilaterally with good excursion.  CV: regular rate and rhythm, no murmurs, no carotid bruits.   Lower extremities color normal, vascularity normal, no mari  Skin: No rashes or lesions in visible areas  Neuro: CNs grossly intact. Gait steady.         Assessment and Plan:   Assessment/Plan:  1. Encounter to establish care    2. Viral URI with cough  Discussed progression of fibrosis and drums. Encouraged OTC medications and conservative treatment. Monitor    3. Essential hypertension  Stable on current meds. Goal BP less than 140/90  - amlodipine (NORVASC) 5 MG Tab; Take 1 Tab by mouth every day.  Dispense: 90 Tab; Refill: 3  - COMP METABOLIC PANEL; Future  - LIPID PROFILE; Future  - TSH WITH REFLEX TO FT4; Future    4. Renal insufficiency  Stable per patient report. Check labs in one year. Educated on avoidance of NSAIDs when possible  - CBC WITH DIFFERENTIAL; Future  - COMP METABOLIC PANEL; Future    5. Other insomnia  Stable continue Ambien as needed    6. Screening for lipid disorders  - LIPID PROFILE; Future    Records requested     Follow up:  Return in about 11 months (around 12/11/2018).    Educated in proper administration of medication(s) ordered today including safety, possible SE, risks, benefits, rationale and alternatives to therapy. "   Supportive care, differential diagnoses, and indications for immediate follow-up discussed with patient.    Pathogenesis of diagnosis discussed including typical length and natural progression.    Instructed to return to clinic or nearest emergency department for any change in condition, further concerns, or worsening of symptoms.  Patient states understanding of the plan of care and discharge instructions.    Please note that this dictation was created using voice recognition software. I have made every reasonable attempt to correct obvious errors, but I expect that there are errors of grammar and possibly content that I did not discover before finalizing the note.    Followup: Return in about 11 months (around 12/11/2018). sooner should new symptoms or problems arise.

## 2018-02-08 DIAGNOSIS — I10 ESSENTIAL HYPERTENSION: ICD-10-CM

## 2018-02-14 ENCOUNTER — TELEPHONE (OUTPATIENT)
Dept: CARDIOLOGY | Facility: MEDICAL CENTER | Age: 78
End: 2018-02-14

## 2018-02-14 NOTE — TELEPHONE ENCOUNTER
"I called the patient at [phone] (D) and spoke with the patient directly regarding:  -I confirmed her appointment with TF for 02/22/18 @ 3964.  -The patient has the lab order for VAP- Cholesterol and will get that done at a [facility] prior to her appointment.   - The patient mentioned her insurance changed slightly and she has a new PCP-Tom Kunz. The patient stated she has [employer] insurance but does not have \"open access\" like before and wanted to know if she needs a referral for Cardiology. I told the patient I was unsure but recommeded the patient contact her PCP to see. I told the patient I would check on our end as well.   "

## 2018-02-17 ENCOUNTER — HOSPITAL ENCOUNTER (OUTPATIENT)
Dept: LAB | Facility: MEDICAL CENTER | Age: 78
End: 2018-02-17
Attending: INTERNAL MEDICINE
Payer: COMMERCIAL

## 2018-02-20 ENCOUNTER — TELEPHONE (OUTPATIENT)
Dept: CARDIOLOGY | Facility: MEDICAL CENTER | Age: 78
End: 2018-02-20

## 2018-02-20 NOTE — TELEPHONE ENCOUNTER
Pt reports that she tried to get VAP cholesterol done at [facility], but was unable to get done. Apologized to pt for the inconvenience and that unfortunately no lab in [place] does VAP cholesterol anymore. Educated pt to please follow up with Dr. Franklin as planned on 2/22. Pt states understanding and is appreciative of call back.     ----- Message from Mckayla Wilkinson sent at 2/20/2018  8:00 AM PST -----  Regarding: lab told patient they can't run lab work  [place]    Patient went to lab on Saturday for blood draw. Lab told her they couldn't run the test. She would like a callback on her cell at [phone].

## 2018-02-22 ENCOUNTER — OFFICE VISIT (OUTPATIENT)
Dept: CARDIOLOGY | Facility: MEDICAL CENTER | Age: 78
End: 2018-02-22
Payer: COMMERCIAL

## 2018-02-22 VITALS
HEART RATE: 78 BPM | SYSTOLIC BLOOD PRESSURE: 126 MMHG | BODY MASS INDEX: 27.68 KG/M2 | WEIGHT: 150.4 LBS | HEIGHT: 62 IN | DIASTOLIC BLOOD PRESSURE: 80 MMHG | OXYGEN SATURATION: 94 %

## 2018-02-22 DIAGNOSIS — I10 ESSENTIAL HYPERTENSION: ICD-10-CM

## 2018-02-22 PROCEDURE — 99213 OFFICE O/P EST LOW 20 MIN: CPT | Performed by: INTERNAL MEDICINE

## 2018-02-22 RX ORDER — OLMESARTAN/HYDROCHLOROTHIAZIDE 40-12.5 MG
1 TABLET ORAL DAILY
COMMUNITY
Start: 2017-12-28 | End: 2024-03-18

## 2018-02-22 NOTE — PROGRESS NOTES
Subjective:   Aurelia Liz is a 77 y.o. female who presents today      for follow-up of hypertension. The patient's blood pressure has been pretty well controlled at home with 135-145/80 be in common numbers. She is no longer having issues with amlodipine. She went off that for a few days but her blood pressure went up to 166; she went back on 5 mg a day which she is taking at this time. She has no headaches and has not noticed any sensation of extremely high blood pressure area and no flushing, lightheadedness, dizziness, symptoms of TIA, and no stroke symptoms.      Chief Complaint: Hypertension    Past Medical History:   Diagnosis Date   • Benign tumor of breast    • HTN (hypertension)    • Insomnia    • Pain    • Renal disorder     'watching kidney function,sees a nephrologist anually'   • Unspecified cataract      Past Surgical History:   Procedure Laterality Date   • KNEE ARTHROSCOPY  12/18/2014    Performed by Robert Hernandez M.D. at SUR[facility]ERY [facility]   • MEDIAL MENISCECTOMY  12/18/2014    Performed by Robert Hernandez M.D. at SURGERY [facility]   • CATARACT PHACO WITH IOL  6/3/2013    Performed by Chris Nguyen M.D. at [facility] ORS   • CATARACT PHACO WITH IOL  5/13/2013    Performed by Chris Nguyen M.D. at [facility] ORS   • HYSTERECTOMY RADICAL      w/bilateral oopherectomy      Family History   Problem Relation Age of Onset   • Breast Cancer Mother 70   • Stroke Mother    • Breast Cancer Sister 50   • Heart Failure Father    • Hypertension Father    • Diabetes Maternal Aunt    • Diabetes Maternal Uncle      History   Smoking Status   • Former Smoker   • Packs/day: 1.50   • Years: 20.00   • Types: Cigarettes   • Start date: 1/1/1989   Smokeless Tobacco   • Never Used     Allergies   Allergen Reactions   • Sulfa Drugs Hives   • Other Drug      'NO IBUPROFEN,R/T KIDNEY FUNCTION'      Outpatient Encounter Prescriptions as of 2/22/2018   Medication Sig  "Dispense Refill   • BENICAR HCT 40-12.5 MG per tablet      • zolpidem (AMBIEN) 5 MG Tab Take 5 mg by mouth at bedtime as needed for Sleep.     • amlodipine (NORVASC) 5 MG Tab Take 1 Tab by mouth every day. 90 Tab 3   • vitamin D (CHOLECALCIFEROL) 1000 UNIT TABS Take 1,000 Units by mouth every day.     • aspirin EC (ECOTRIN) 81 MG TBEC Take 81 mg by mouth every day.     • olmesartan-hydrochlorothiazide (BENICAR HCT) 20-12.5 MG per tablet Take 1 Tab by mouth 2 times a day.       No facility-administered encounter medications on file as of 2/22/2018.      ROS. The history of present illness     Objective:   /80   Pulse 78   Ht 1.575 m (5' 2\")   Wt 68.2 kg (150 lb 6.4 oz)   SpO2 94%   BMI 27.51 kg/m²     Physical Exam General: WD, WN, female in NAD. Weight is up 5 pounds. Repeat blood pressure 124/64 right arm sitting, 128/60 left arm sitting  Neck: No JVD.  Good carotid upstroke and no bruit  Chest: Clear to A & P  Heart: Regular rhythm, Normal S1 and S2. No murmur, gallop, rub, click  Ext: No edema  Neuro: Alert, oriented  Psych: normal mood, affect    Assessment:     1. Essential hypertension         Medical Decision Making:  Today's Assessment / Status / Plan:   The patient's blood pressure is pretty well controlled both at home and here. She's tolerating her medications well. She has some questions about whether she will have as good a control with the generic drugs and I assured her that it is quite likely the generics will be as effective as the brand name. She will notify me if there is a difference in her blood pressure control. In the meantime she will continue to watch salt intake and take her medications regularly checking her blood pressure once a week or so. She will return in 6 months or sooner if there are any questions.  "

## 2018-03-13 ENCOUNTER — TELEPHONE (OUTPATIENT)
Dept: MEDICAL GROUP | Facility: PHYSICIAN GROUP | Age: 78
End: 2018-03-13

## 2018-03-13 ENCOUNTER — PATIENT OUTREACH (OUTPATIENT)
Dept: HEALTH INFORMATION MANAGEMENT | Facility: OTHER | Age: 78
End: 2018-03-13

## 2018-03-13 DIAGNOSIS — Z78.0 MENOPAUSE: ICD-10-CM

## 2018-03-13 NOTE — PROGRESS NOTES
Attempt #:Final    WebIZ Checked & Epic Updated: no records found  HealthConnect Verified: no  Verify PCP: yes    Communication Preference Obtained: yes      Care Gap Scheduling (Attempt to Schedule EACH Overdue Care Gap!)  Health Maintenance Due   Topic Date Due   • IMM DTaP/Tdap/Td Vaccine (1 - Tdap) 10/16/1959   • COLONOSCOPY  10/16/1990 / Pt said that she had a Colonoscopy but doesn't remember where or when.   • IMM ZOSTER VACCINE  10/16/2000   • IMM PNEUMOCOCCAL 65+ (ADULT) LOW/MEDIUM RISK SERIES (1 of 2 - PCV13) 10/16/2005   • BONE DENSITY  10/26/2015 // Scheduled   • IMM INFLUENZA (1) 09/01/2017     Pt declined Immunizations/ Pt stated that her choice to don't get vaccines    MyChart Activation: already active

## 2018-03-28 ENCOUNTER — TELEPHONE (OUTPATIENT)
Dept: MEDICAL GROUP | Facility: PHYSICIAN GROUP | Age: 78
End: 2018-03-28

## 2018-03-28 ENCOUNTER — HOSPITAL ENCOUNTER (OUTPATIENT)
Dept: RADIOLOGY | Facility: MEDICAL CENTER | Age: 78
End: 2018-03-28
Attending: FAMILY MEDICINE
Payer: COMMERCIAL

## 2018-03-28 DIAGNOSIS — Z78.0 MENOPAUSE: ICD-10-CM

## 2018-03-28 PROBLEM — M85.88 OSTEOPENIA OF SPINE: Status: ACTIVE | Noted: 2018-03-28

## 2018-03-28 PROCEDURE — 77080 DXA BONE DENSITY AXIAL: CPT

## 2018-03-29 NOTE — TELEPHONE ENCOUNTER
----- Message from Jordyn Franklin D.O. sent at 3/28/2018 11:18 AM PDT -----  Please advise pt that the bone scan does show osteopenia along with a decrease in bone mass of the femur. Patient needs an appointment with Tom who is her primary care provider for discussion on possible medication.    Jordyn Franklin D.O.

## 2018-03-29 NOTE — TELEPHONE ENCOUNTER
Phone Number Called: [phone] (home)     Message: Left message to call back      Left Message for patient to call back: yes

## 2018-05-31 ENCOUNTER — HOSPITAL ENCOUNTER (OUTPATIENT)
Dept: LAB | Facility: MEDICAL CENTER | Age: 78
End: 2018-05-31
Attending: NURSE PRACTITIONER
Payer: COMMERCIAL

## 2018-05-31 LAB
25(OH)D3 SERPL-MCNC: 35 NG/ML (ref 30–100)
ALBUMIN SERPL BCP-MCNC: 3.8 G/DL (ref 3.2–4.9)
ALBUMIN/GLOB SERPL: 1.2 G/DL
ALP SERPL-CCNC: 85 U/L (ref 30–99)
ALT SERPL-CCNC: 13 U/L (ref 2–50)
ANION GAP SERPL CALC-SCNC: 8 MMOL/L (ref 0–11.9)
APPEARANCE UR: CLEAR
AST SERPL-CCNC: 15 U/L (ref 12–45)
BASOPHILS # BLD AUTO: 1.1 % (ref 0–1.8)
BASOPHILS # BLD: 0.05 K/UL (ref 0–0.12)
BILIRUB SERPL-MCNC: 0.9 MG/DL (ref 0.1–1.5)
BILIRUB UR QL STRIP.AUTO: NEGATIVE
BUN SERPL-MCNC: 27 MG/DL (ref 8–22)
CALCIUM SERPL-MCNC: 8.8 MG/DL (ref 8.5–10.5)
CHLORIDE SERPL-SCNC: 105 MMOL/L (ref 96–112)
CO2 SERPL-SCNC: 25 MMOL/L (ref 20–33)
COLOR UR: YELLOW
CREAT SERPL-MCNC: 1.08 MG/DL (ref 0.5–1.4)
CREAT UR-MCNC: 111.9 MG/DL
EOSINOPHIL # BLD AUTO: 0.25 K/UL (ref 0–0.51)
EOSINOPHIL NFR BLD: 5.4 % (ref 0–6.9)
ERYTHROCYTE [DISTWIDTH] IN BLOOD BY AUTOMATED COUNT: 45.5 FL (ref 35.9–50)
GLOBULIN SER CALC-MCNC: 3.3 G/DL (ref 1.9–3.5)
GLUCOSE SERPL-MCNC: 95 MG/DL (ref 65–99)
GLUCOSE UR STRIP.AUTO-MCNC: NEGATIVE MG/DL
HCT VFR BLD AUTO: 40.2 % (ref 37–47)
HGB BLD-MCNC: 13.2 G/DL (ref 12–16)
IMM GRANULOCYTES # BLD AUTO: 0.01 K/UL (ref 0–0.11)
IMM GRANULOCYTES NFR BLD AUTO: 0.2 % (ref 0–0.9)
KETONES UR STRIP.AUTO-MCNC: NEGATIVE MG/DL
LEUKOCYTE ESTERASE UR QL STRIP.AUTO: NEGATIVE
LYMPHOCYTES # BLD AUTO: 1.27 K/UL (ref 1–4.8)
LYMPHOCYTES NFR BLD: 27.6 % (ref 22–41)
MAGNESIUM SERPL-MCNC: 1.9 MG/DL (ref 1.5–2.5)
MCH RBC QN AUTO: 29.5 PG (ref 27–33)
MCHC RBC AUTO-ENTMCNC: 32.8 G/DL (ref 33.6–35)
MCV RBC AUTO: 89.9 FL (ref 81.4–97.8)
MICRO URNS: NORMAL
MONOCYTES # BLD AUTO: 0.43 K/UL (ref 0–0.85)
MONOCYTES NFR BLD AUTO: 9.3 % (ref 0–13.4)
NEUTROPHILS # BLD AUTO: 2.59 K/UL (ref 2–7.15)
NEUTROPHILS NFR BLD: 56.4 % (ref 44–72)
NITRITE UR QL STRIP.AUTO: NEGATIVE
NRBC # BLD AUTO: 0 K/UL
NRBC BLD-RTO: 0 /100 WBC
PH UR STRIP.AUTO: 5.5 [PH]
PHOSPHATE SERPL-MCNC: 3 MG/DL (ref 2.5–4.5)
PLATELET # BLD AUTO: 255 K/UL (ref 164–446)
PMV BLD AUTO: 10.8 FL (ref 9–12.9)
POTASSIUM SERPL-SCNC: 4 MMOL/L (ref 3.6–5.5)
PROT SERPL-MCNC: 7.1 G/DL (ref 6–8.2)
PROT UR QL STRIP: NEGATIVE MG/DL
PROT UR-MCNC: 14.7 MG/DL (ref 0–15)
PROT/CREAT UR: 131 MG/G (ref 10–107)
RBC # BLD AUTO: 4.47 M/UL (ref 4.2–5.4)
RBC UR QL AUTO: NEGATIVE
SODIUM SERPL-SCNC: 138 MMOL/L (ref 135–145)
SP GR UR STRIP.AUTO: 1.02
URATE SERPL-MCNC: 7.1 MG/DL (ref 1.9–8.2)
UROBILINOGEN UR STRIP.AUTO-MCNC: 0.2 MG/DL
WBC # BLD AUTO: 4.6 K/UL (ref 4.8–10.8)

## 2018-05-31 PROCEDURE — 85025 COMPLETE CBC W/AUTO DIFF WBC: CPT

## 2018-05-31 PROCEDURE — 84100 ASSAY OF PHOSPHORUS: CPT

## 2018-05-31 PROCEDURE — 84156 ASSAY OF PROTEIN URINE: CPT

## 2018-05-31 PROCEDURE — 80053 COMPREHEN METABOLIC PANEL: CPT

## 2018-05-31 PROCEDURE — 36415 COLL VENOUS BLD VENIPUNCTURE: CPT

## 2018-05-31 PROCEDURE — 82306 VITAMIN D 25 HYDROXY: CPT

## 2018-05-31 PROCEDURE — 82570 ASSAY OF URINE CREATININE: CPT

## 2018-05-31 PROCEDURE — 83735 ASSAY OF MAGNESIUM: CPT

## 2018-05-31 PROCEDURE — 81003 URINALYSIS AUTO W/O SCOPE: CPT

## 2018-05-31 PROCEDURE — 84550 ASSAY OF BLOOD/URIC ACID: CPT

## 2018-06-21 ENCOUNTER — PATIENT OUTREACH (OUTPATIENT)
Dept: HEALTH INFORMATION MANAGEMENT | Facility: OTHER | Age: 78
End: 2018-06-21

## 2018-06-21 NOTE — PROGRESS NOTES
1. Attempt #: 1    2. HealthConnect Verified: yes    3. Verify PCP: yes    4. Care Team Updated:       •   DME Company (gait device, O2, CPAP, etc.): N\A       •   Other Specialists (eye doctor, derm, GYN, cardiology, endo, etc): N\A    5.  Reviewed/Updated the following with patient:       •   Communication Preference Obtained? YES       •   Preferred Pharmacy? NO       •   Preferred Lab? NO       •   Family History (document living status of immediate family members and if + hx of cancer, diabetes, hypertension, hyperlipidemia, heart attack, stroke) NO    6. [employer] Activation: already active    7. [employer] Sp: no    9. Care Gap Scheduling (Attempt to Schedule EACH Overdue Care Gap!)     Health Maintenance Due   Topic Date Due   • IMM DTaP/Tdap/Td Vaccine (1 - Tdap) 10/16/1959   • COLONOSCOPY  10/16/1990        Patient declined Immunizations: TDAP.    10. Patient was NOT advised: “This is a free wellness visit. The provider will screen for medical conditions to help you stay healthy. If you have other concerns to address you may be asked to discuss these at a separate visit or there may be an additional fee.”     11. Patient was NOT informed to arrive 15 min prior to their scheduled appointment and bring in their medication bottles.

## 2018-08-27 ENCOUNTER — OFFICE VISIT (OUTPATIENT)
Dept: CARDIOLOGY | Facility: MEDICAL CENTER | Age: 78
End: 2018-08-27
Payer: COMMERCIAL

## 2018-08-27 VITALS
HEIGHT: 63 IN | BODY MASS INDEX: 27.11 KG/M2 | RESPIRATION RATE: 14 BRPM | DIASTOLIC BLOOD PRESSURE: 80 MMHG | SYSTOLIC BLOOD PRESSURE: 130 MMHG | OXYGEN SATURATION: 92 % | HEART RATE: 70 BPM | WEIGHT: 153 LBS

## 2018-08-27 DIAGNOSIS — I10 ESSENTIAL HYPERTENSION: ICD-10-CM

## 2018-08-27 PROCEDURE — 99213 OFFICE O/P EST LOW 20 MIN: CPT | Performed by: INTERNAL MEDICINE

## 2018-08-27 NOTE — PROGRESS NOTES
Chief Complaint   Patient presents with   • Follow-Up     Essential hypertension.       Subjective:   Aurelia Liz is a 77 y.o. female who presents today in follow-up for hypertension.  Patient no longer checks her blood pressures at home because when she was doing that it made her extremely nervous.  She has no symptoms to suggest that her blood pressure is high.  She has no headaches flushing or palpitations.  When checked at other doctor's offices blood pressure has been good.  She has no dyspnea on exertion, orthopnea, PND, or pedal edema.  She has no chest pain, pressure, tightness.    Past Medical History:   Diagnosis Date   • Benign tumor of breast    • HTN (hypertension)    • Insomnia    • Pain    • Renal disorder     'watching kidney function,sees a nephrologist anually'   • Unspecified cataract      Past Surgical History:   Procedure Laterality Date   • KNEE ARTHROSCOPY  12/18/2014    Performed by Robert Hernandez M.D. at SURGERY [facility]S   • MEDIAL MENISCECTOMY  12/18/2014    Performed by Robert Hernandez M.D. at SUR[facility]ERY [facility]   • CATARACT PHACO WITH IOL  6/3/2013    Performed by Chris Nguyen M.D. at SUR[facility]ERY [facility]   • CATARACT PHACO WITH IOL  5/13/2013    Performed by Chris Nguyen M.D. at [facility]   • HYSTERECTOMY RADICAL      w/bilateral oopherectomy      Family History   Problem Relation Age of Onset   • Breast Cancer Mother 70   • Stroke Mother    • Breast Cancer Sister 50   • Heart Failure Father    • Hypertension Father    • Diabetes Maternal Aunt    • Diabetes Maternal Uncle      Social History     Social History   • Marital status:      Spouse name: N/A   • Number of children: N/A   • Years of education: N/A     Occupational History   • Not on file.     Social History Main Topics   • Smoking status: Former Smoker     Packs/day: 1.50     Years: 20.00     Types: Cigarettes     Start date: 1/1/1989   • Smokeless tobacco: Never Used  "  • Alcohol use 1.8 oz/week     3 Glasses of wine per week   • Drug use: No   • Sexual activity: Yes     Partners: Male     Other Topics Concern   • Not on file     Social History Narrative   • No narrative on file     Allergies   Allergen Reactions   • Sulfa Drugs Hives   • Other Drug      'NO IBUPROFEN,R/T KIDNEY FUNCTION'      Outpatient Encounter Prescriptions as of 8/27/2018   Medication Sig Dispense Refill   • BENICAR HCT 40-12.5 MG per tablet Take 1 Tab by mouth every day.     • zolpidem (AMBIEN) 5 MG Tab Take 5 mg by mouth at bedtime as needed for Sleep.     • amlodipine (NORVASC) 5 MG Tab Take 1 Tab by mouth every day. (Patient taking differently: Take 5 mg by mouth every 48 hours.) 90 Tab 3   • vitamin D (CHOLECALCIFEROL) 1000 UNIT TABS Take 1,000 Units by mouth every day.     • aspirin EC (ECOTRIN) 81 MG TBEC Take 81 mg by mouth every day.     • olmesartan-hydrochlorothiazide (BENICAR HCT) 20-12.5 MG per tablet Take 1 Tab by mouth 2 times a day.       No facility-administered encounter medications on file as of 8/27/2018.      ROS.  See HPI     Objective:   /80   Pulse 70   Resp 14   Ht 1.588 m (5' 2.5\")   Wt 69.4 kg (153 lb)   SpO2 92%   BMI 27.54 kg/m²     Physical Exam General: WD, WN, female in NAD. Weight is about the same.  Repeat blood pressure 138 70 left arm sitting  Neck: No JVD.  Good carotid upstroke and no bruit  Chest: Clear to A & P  Heart: Regular rhythm, Normal S1 and S2. No murmur, gallop, rub, click  Ext: No edema  Neuro: Alert, oriented  Psych: normal mood, affect    Assessment:     1. Essential hypertension         Medical Decision Making:  Today's Assessment / Status / Plan:   Overall the patient's blood pressure is pretty well controlled.  I suggested she watch her salt intake and continue the Benicar and amlodipine combination.  Currently she is taking amlodipine every other day which seems to be effective.  She will return in 6 months or as needed.  "

## 2018-09-06 ENCOUNTER — PATIENT OUTREACH (OUTPATIENT)
Dept: HEALTH INFORMATION MANAGEMENT | Facility: OTHER | Age: 78
End: 2018-09-06

## 2018-09-19 ENCOUNTER — PATIENT OUTREACH (OUTPATIENT)
Dept: HEALTH INFORMATION MANAGEMENT | Facility: OTHER | Age: 78
End: 2018-09-19

## 2018-09-19 NOTE — PROGRESS NOTES
Called and spoke with patient regarding the [facility]. She is not interested in the Community Care [facility] Program at this time. Declined services. Phone number given if patient is interested in the future.

## 2019-01-14 DIAGNOSIS — I10 ESSENTIAL HYPERTENSION: ICD-10-CM

## 2019-01-14 RX ORDER — AMLODIPINE BESYLATE 5 MG/1
TABLET ORAL
Qty: 30 TAB | Refills: 2 | Status: SHIPPED | OUTPATIENT
Start: 2019-01-14

## 2019-01-14 NOTE — TELEPHONE ENCOUNTER
Requested Prescriptions     Pending Prescriptions Disp Refills   • amLODIPine (NORVASC) 5 MG Tab [Pharmacy Med Name: AmLODIPine Besylate Oral Tablet 5 MG] 30 Tab 2     Sig: TAKE 1 TABLET BY MOUTH MOUTH DAY.       RAIN Espinoza.

## 2021-01-15 DIAGNOSIS — Z23 NEED FOR VACCINATION: ICD-10-CM

## 2021-01-27 PROCEDURE — 91300 PFIZER SARS-COV-2 VACCINE: CPT

## 2021-01-27 PROCEDURE — 0001A PFIZER SARS-COV-2 VACCINE: CPT

## 2021-01-29 ENCOUNTER — IMMUNIZATION (OUTPATIENT)
Dept: FAMILY PLANNING/WOMEN'S HEALTH CLINIC | Facility: IMMUNIZATION CENTER | Age: 81
End: 2021-01-29
Payer: COMMERCIAL

## 2021-01-29 DIAGNOSIS — Z23 ENCOUNTER FOR VACCINATION: Primary | ICD-10-CM

## 2021-02-18 ENCOUNTER — IMMUNIZATION (OUTPATIENT)
Dept: FAMILY PLANNING/WOMEN'S HEALTH CLINIC | Facility: IMMUNIZATION CENTER | Age: 81
End: 2021-02-18
Attending: INTERNAL MEDICINE
Payer: COMMERCIAL

## 2021-02-18 DIAGNOSIS — Z23 ENCOUNTER FOR VACCINATION: Primary | ICD-10-CM

## 2021-02-18 PROCEDURE — 0002A PFIZER SARS-COV-2 VACCINE: CPT

## 2021-02-18 PROCEDURE — 91300 PFIZER SARS-COV-2 VACCINE: CPT

## 2022-04-18 ENCOUNTER — APPOINTMENT (RX ONLY)
Dept: URBAN - METROPOLITAN AREA CLINIC 22 | Facility: CLINIC | Age: 82
Setting detail: DERMATOLOGY
End: 2022-04-18

## 2022-04-18 DIAGNOSIS — L82.1 OTHER SEBORRHEIC KERATOSIS: ICD-10-CM

## 2022-04-18 DIAGNOSIS — L57.0 ACTINIC KERATOSIS: ICD-10-CM

## 2022-04-18 DIAGNOSIS — L81.4 OTHER MELANIN HYPERPIGMENTATION: ICD-10-CM

## 2022-04-18 PROCEDURE — ? LIQUID NITROGEN

## 2022-04-18 PROCEDURE — ? COUNSELING

## 2022-04-18 PROCEDURE — 17000 DESTRUCT PREMALG LESION: CPT

## 2022-04-18 PROCEDURE — 99203 OFFICE O/P NEW LOW 30 MIN: CPT | Mod: 25

## 2022-04-18 ASSESSMENT — LOCATION ZONE DERM
LOCATION ZONE: FACE
LOCATION ZONE: TRUNK
LOCATION ZONE: NOSE

## 2022-04-18 ASSESSMENT — PAIN INTENSITY VAS: HOW INTENSE IS YOUR PAIN 0 BEING NO PAIN, 10 BEING THE MOST SEVERE PAIN POSSIBLE?: NO PAIN

## 2022-04-18 ASSESSMENT — LOCATION DETAILED DESCRIPTION DERM
LOCATION DETAILED: LEFT INFERIOR UPPER BACK
LOCATION DETAILED: NASAL DORSUM
LOCATION DETAILED: RIGHT INFERIOR FOREHEAD

## 2022-04-18 ASSESSMENT — LOCATION SIMPLE DESCRIPTION DERM
LOCATION SIMPLE: NOSE
LOCATION SIMPLE: RIGHT FOREHEAD
LOCATION SIMPLE: LEFT UPPER BACK

## 2022-04-18 NOTE — PROCEDURE: LIQUID NITROGEN
Show Aperture Variable?: Yes
Consent: The patient's consent was obtained including but not limited to risks of crusting, scabbing, blistering, scarring, darker or lighter pigmentary change, recurrence, incomplete removal and infection.
Render Post-Care Instructions In Note?: no
Number Of Freeze-Thaw Cycles: 2 freeze-thaw cycles
Detail Level: Detailed
Post-Care Instructions: I reviewed with the patient in detail post-care instructions. Patient is to wear sunprotection, and avoid picking at any of the treated lesions. Pt may apply Vaseline to crusted or scabbing areas.
Duration Of Freeze Thaw-Cycle (Seconds): 3

## 2023-11-01 NOTE — TELEPHONE ENCOUNTER
CC:  Geni Chand This patient is overdue for health maintenance topics that need orders so she can complete them.     Please review the pended orders in  to sign or make adjustments as appropriate.    Close the encounter when complete.  If declining these orders, please document a reason and route to the Outreach MA pool (p AMB  Outreach).  I will call the patient to cancel the appointment.

## 2025-02-18 ENCOUNTER — HOSPITAL ENCOUNTER (OUTPATIENT)
Dept: RADIOLOGY | Facility: MEDICAL CENTER | Age: 85
End: 2025-02-18
Attending: STUDENT IN AN ORGANIZED HEALTH CARE EDUCATION/TRAINING PROGRAM
Payer: COMMERCIAL

## 2025-02-18 DIAGNOSIS — M79.662 PAIN OF LEFT CALF: ICD-10-CM

## 2025-02-18 PROCEDURE — 93971 EXTREMITY STUDY: CPT

## 2025-02-19 ENCOUNTER — APPOINTMENT (OUTPATIENT)
Dept: SPORTS MEDICINE | Facility: OTHER | Age: 85
End: 2025-02-19
Payer: COMMERCIAL